# Patient Record
Sex: FEMALE | Race: OTHER | URBAN - METROPOLITAN AREA
[De-identification: names, ages, dates, MRNs, and addresses within clinical notes are randomized per-mention and may not be internally consistent; named-entity substitution may affect disease eponyms.]

---
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
## 2025-03-25 NOTE — PROCEDURAL SAFETY CHECKLIST WITH OR WITHOUT SEDATION - NSRESOLVEDISCREP_GEN_ALL_CORE
[facility] EMERGENCY DEPARTMENT      EMERGENCY MEDICINE     Pt Name: Rahel Blount  MRN: [id number]  Birthdate 1989  Date of evaluation: 3/25/2025  Provider: Claudia Moncada PA-C    CHIEF COMPLAINT       Chief Complaint   Patient presents with    Back Pain     HISTORY OF PRESENT ILLNESS   Rahel Blount is a pleasant 35 y.o. female who presents to the emergency department from from home, as a walk in to the ED lobby for evaluation of back pain.    Patient presents to the ED for low back pain.  She stated that it started on 3/23/2025 on  after she was working all day.  She stated that at the end of her shift she felt her low back started.  She describes the pain as constant, sometimes sharp.  Patient denies any injury to the area, lifting heavy objects, strain to the back.  Patient went to the chiropractor yesterday with no alleviation.  Patient denies taking any pain medication.  Patient denies any spinal surgeries in the past.    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

## 2025-05-09 NOTE — ED PEDIATRIC NURSE NOTE - NS CRAFFT PART A2 ALCOHOL
Problem: Discharge Planning  Goal: Discharge to home or other facility with appropriate resources  5/9/2025 0104 by Renetta Brown, RN  Outcome: Progressing     Problem: Skin/Tissue Integrity  Goal: Skin integrity remains intact  Description: 1.  Monitor for areas of redness and/or skin breakdown2.  Assess vascular access sites hourly3.  Every 4-6 hours minimum:  Change oxygen saturation probe site4.  Every 4-6 hours:  If on nasal continuous positive airway pressure, respiratory therapy assess nares and determine need for appliance change or resting period  5/9/2025 0104 by Renetta Brown, RN  Outcome: Progressing  Flowsheets (Taken 5/9/2025 0103)  Skin Integrity Remains Intact: Monitor for areas of redness and/or skin breakdown     Problem: Safety - Adult  Goal: Free from fall injury  5/9/2025 0104 by Renetta Brown, RN  Outcome: Progressing     Problem: ABCDS Injury Assessment  Goal: Absence of physical injury  Outcome: Progressing     Problem: Nutrition Deficit:  Goal: Optimize nutritional status  Outcome: Progressing     Problem: Chronic Conditions and Co-morbidities  Goal: Patient's chronic conditions and co-morbidity symptoms are monitored and maintained or improved  Outcome: Progressing      No

## 2025-06-30 ENCOUNTER — EMERGENCY (EMERGENCY)
Facility: HOSPITAL | Age: 20
LOS: 0 days | Discharge: HOME | End: 2025-06-30
Attending: PEDIATRICS
Payer: MEDICAID

## 2025-06-30 VITALS
WEIGHT: 134.04 LBS | OXYGEN SATURATION: 97 % | RESPIRATION RATE: 19 BRPM | SYSTOLIC BLOOD PRESSURE: 99 MMHG | HEART RATE: 131 BPM | DIASTOLIC BLOOD PRESSURE: 69 MMHG | TEMPERATURE: 99 F

## 2025-06-30 VITALS
OXYGEN SATURATION: 98 % | TEMPERATURE: 98 F | RESPIRATION RATE: 18 BRPM | SYSTOLIC BLOOD PRESSURE: 115 MMHG | DIASTOLIC BLOOD PRESSURE: 73 MMHG | HEART RATE: 85 BPM

## 2025-06-30 DIAGNOSIS — R11.10 VOMITING, UNSPECIFIED: ICD-10-CM

## 2025-06-30 DIAGNOSIS — N76.0 ACUTE VAGINITIS: ICD-10-CM

## 2025-06-30 DIAGNOSIS — R10.32 LEFT LOWER QUADRANT PAIN: ICD-10-CM

## 2025-06-30 DIAGNOSIS — R00.0 TACHYCARDIA, UNSPECIFIED: ICD-10-CM

## 2025-06-30 DIAGNOSIS — N83.202 UNSPECIFIED OVARIAN CYST, LEFT SIDE: ICD-10-CM

## 2025-06-30 DIAGNOSIS — G60.0 HEREDITARY MOTOR AND SENSORY NEUROPATHY: ICD-10-CM

## 2025-06-30 DIAGNOSIS — R10.2 PELVIC AND PERINEAL PAIN: ICD-10-CM

## 2025-06-30 DIAGNOSIS — R19.7 DIARRHEA, UNSPECIFIED: ICD-10-CM

## 2025-06-30 LAB
ALBUMIN SERPL ELPH-MCNC: 4 G/DL — SIGNIFICANT CHANGE UP (ref 3.5–5.2)
ALP SERPL-CCNC: 39 U/L — SIGNIFICANT CHANGE UP (ref 30–115)
ALT FLD-CCNC: 8 U/L — LOW (ref 14–37)
ANION GAP SERPL CALC-SCNC: 12 MMOL/L — SIGNIFICANT CHANGE UP (ref 7–14)
APPEARANCE UR: CLEAR — SIGNIFICANT CHANGE UP
AST SERPL-CCNC: 18 U/L — SIGNIFICANT CHANGE UP (ref 14–37)
BASOPHILS # BLD AUTO: 0.01 K/UL — SIGNIFICANT CHANGE UP (ref 0–0.2)
BASOPHILS NFR BLD AUTO: 0.1 % — SIGNIFICANT CHANGE UP (ref 0–1)
BILIRUB SERPL-MCNC: 0.3 MG/DL — SIGNIFICANT CHANGE UP (ref 0.2–1.2)
BILIRUB UR-MCNC: NEGATIVE — SIGNIFICANT CHANGE UP
BUN SERPL-MCNC: 11 MG/DL — SIGNIFICANT CHANGE UP (ref 10–20)
CALCIUM SERPL-MCNC: 8.7 MG/DL — SIGNIFICANT CHANGE UP (ref 8.4–10.5)
CHLORIDE SERPL-SCNC: 103 MMOL/L — SIGNIFICANT CHANGE UP (ref 98–110)
CO2 SERPL-SCNC: 20 MMOL/L — SIGNIFICANT CHANGE UP (ref 17–32)
COLOR SPEC: YELLOW — SIGNIFICANT CHANGE UP
CREAT SERPL-MCNC: 0.6 MG/DL — SIGNIFICANT CHANGE UP (ref 0.3–1)
DIFF PNL FLD: ABNORMAL
EGFR: 133 ML/MIN/1.73M2 — SIGNIFICANT CHANGE UP
EGFR: 133 ML/MIN/1.73M2 — SIGNIFICANT CHANGE UP
EOSINOPHIL # BLD AUTO: 0 K/UL — SIGNIFICANT CHANGE UP (ref 0–0.7)
EOSINOPHIL NFR BLD AUTO: 0 % — SIGNIFICANT CHANGE UP (ref 0–8)
GLUCOSE SERPL-MCNC: 98 MG/DL — SIGNIFICANT CHANGE UP (ref 70–99)
GLUCOSE UR QL: NEGATIVE MG/DL — SIGNIFICANT CHANGE UP
HCT VFR BLD CALC: 38.8 % — SIGNIFICANT CHANGE UP (ref 37–47)
HGB BLD-MCNC: 12.7 G/DL — SIGNIFICANT CHANGE UP (ref 12–16)
IMM GRANULOCYTES NFR BLD AUTO: 0.5 % — HIGH (ref 0.1–0.3)
INR BLD: 1.29 RATIO — SIGNIFICANT CHANGE UP (ref 0.65–1.3)
KETONES UR QL: 15 MG/DL
LEUKOCYTE ESTERASE UR-ACNC: ABNORMAL
LYMPHOCYTES # BLD AUTO: 0.81 K/UL — LOW (ref 1.2–3.4)
LYMPHOCYTES # BLD AUTO: 6.8 % — LOW (ref 20.5–51.1)
MCHC RBC-ENTMCNC: 28.9 PG — SIGNIFICANT CHANGE UP (ref 27–31)
MCHC RBC-ENTMCNC: 32.7 G/DL — SIGNIFICANT CHANGE UP (ref 32–37)
MCV RBC AUTO: 88.2 FL — SIGNIFICANT CHANGE UP (ref 81–99)
MONOCYTES # BLD AUTO: 0.54 K/UL — SIGNIFICANT CHANGE UP (ref 0.1–0.6)
MONOCYTES NFR BLD AUTO: 4.5 % — SIGNIFICANT CHANGE UP (ref 1.7–9.3)
NEUTROPHILS # BLD AUTO: 10.53 K/UL — HIGH (ref 1.4–6.5)
NEUTROPHILS NFR BLD AUTO: 88.1 % — HIGH (ref 42.2–75.2)
NITRITE UR-MCNC: NEGATIVE — SIGNIFICANT CHANGE UP
NRBC BLD AUTO-RTO: 0 /100 WBCS — SIGNIFICANT CHANGE UP (ref 0–0)
PH UR: 5.5 — SIGNIFICANT CHANGE UP (ref 5–8)
PLATELET # BLD AUTO: 216 K/UL — SIGNIFICANT CHANGE UP (ref 130–400)
PMV BLD: 10.8 FL — HIGH (ref 7.4–10.4)
POTASSIUM SERPL-MCNC: 4 MMOL/L — SIGNIFICANT CHANGE UP (ref 3.5–5)
POTASSIUM SERPL-SCNC: 4 MMOL/L — SIGNIFICANT CHANGE UP (ref 3.5–5)
PROT SERPL-MCNC: 6.9 G/DL — SIGNIFICANT CHANGE UP (ref 6.1–8)
PROT UR-MCNC: SIGNIFICANT CHANGE UP MG/DL
PROTHROM AB SERPL-ACNC: 15.3 SEC — HIGH (ref 9.95–12.87)
RBC # BLD: 4.4 M/UL — SIGNIFICANT CHANGE UP (ref 4.2–5.4)
RBC # FLD: 14.6 % — HIGH (ref 11.5–14.5)
SODIUM SERPL-SCNC: 135 MMOL/L — SIGNIFICANT CHANGE UP (ref 135–146)
SP GR SPEC: 1.02 — SIGNIFICANT CHANGE UP (ref 1–1.03)
UROBILINOGEN FLD QL: 0.2 MG/DL — SIGNIFICANT CHANGE UP (ref 0.2–1)
WBC # BLD: 11.95 K/UL — HIGH (ref 4.8–10.8)
WBC # FLD AUTO: 11.95 K/UL — HIGH (ref 4.8–10.8)

## 2025-06-30 PROCEDURE — 87086 URINE CULTURE/COLONY COUNT: CPT

## 2025-06-30 PROCEDURE — 36415 COLL VENOUS BLD VENIPUNCTURE: CPT

## 2025-06-30 PROCEDURE — 87591 N.GONORRHOEAE DNA AMP PROB: CPT

## 2025-06-30 PROCEDURE — 86900 BLOOD TYPING SEROLOGIC ABO: CPT

## 2025-06-30 PROCEDURE — 81001 URINALYSIS AUTO W/SCOPE: CPT

## 2025-06-30 PROCEDURE — 96374 THER/PROPH/DIAG INJ IV PUSH: CPT

## 2025-06-30 PROCEDURE — 76705 ECHO EXAM OF ABDOMEN: CPT

## 2025-06-30 PROCEDURE — 80053 COMPREHEN METABOLIC PANEL: CPT

## 2025-06-30 PROCEDURE — 87798 DETECT AGENT NOS DNA AMP: CPT

## 2025-06-30 PROCEDURE — 76705 ECHO EXAM OF ABDOMEN: CPT | Mod: 26

## 2025-06-30 PROCEDURE — 85610 PROTHROMBIN TIME: CPT

## 2025-06-30 PROCEDURE — 76830 TRANSVAGINAL US NON-OB: CPT | Mod: 26

## 2025-06-30 PROCEDURE — 87661 TRICHOMONAS VAGINALIS AMPLIF: CPT

## 2025-06-30 PROCEDURE — 87491 CHLMYD TRACH DNA AMP PROBE: CPT

## 2025-06-30 PROCEDURE — 99283 EMERGENCY DEPT VISIT LOW MDM: CPT

## 2025-06-30 PROCEDURE — 86850 RBC ANTIBODY SCREEN: CPT

## 2025-06-30 PROCEDURE — 99285 EMERGENCY DEPT VISIT HI MDM: CPT

## 2025-06-30 PROCEDURE — 96375 TX/PRO/DX INJ NEW DRUG ADDON: CPT

## 2025-06-30 PROCEDURE — 86901 BLOOD TYPING SEROLOGIC RH(D): CPT

## 2025-06-30 PROCEDURE — 87801 DETECT AGNT MULT DNA AMPLI: CPT

## 2025-06-30 PROCEDURE — 99284 EMERGENCY DEPT VISIT MOD MDM: CPT | Mod: 25

## 2025-06-30 PROCEDURE — 85025 COMPLETE CBC W/AUTO DIFF WBC: CPT

## 2025-06-30 PROCEDURE — 76830 TRANSVAGINAL US NON-OB: CPT

## 2025-06-30 RX ORDER — SODIUM CHLORIDE 9 G/1000ML
1000 INJECTION, SOLUTION INTRAVENOUS ONCE
Refills: 0 | Status: COMPLETED | OUTPATIENT
Start: 2025-06-30 | End: 2025-06-30

## 2025-06-30 RX ORDER — ACETAMINOPHEN 500 MG/5ML
975 LIQUID (ML) ORAL ONCE
Refills: 0 | Status: COMPLETED | OUTPATIENT
Start: 2025-06-30 | End: 2025-06-30

## 2025-06-30 RX ORDER — ONDANSETRON HCL/PF 4 MG/2 ML
4 VIAL (ML) INJECTION ONCE
Refills: 0 | Status: COMPLETED | OUTPATIENT
Start: 2025-06-30 | End: 2025-06-30

## 2025-06-30 RX ORDER — ACETAMINOPHEN 500 MG/5ML
650 LIQUID (ML) ORAL ONCE
Refills: 0 | Status: COMPLETED | OUTPATIENT
Start: 2025-06-30 | End: 2025-06-30

## 2025-06-30 RX ORDER — IBUPROFEN 200 MG
800 TABLET ORAL ONCE
Refills: 0 | Status: COMPLETED | OUTPATIENT
Start: 2025-06-30 | End: 2025-06-30

## 2025-06-30 RX ADMIN — Medication 975 MILLIGRAM(S): at 17:06

## 2025-06-30 RX ADMIN — Medication 650 MILLIGRAM(S): at 12:40

## 2025-06-30 RX ADMIN — SODIUM CHLORIDE 1000 MILLILITER(S): 9 INJECTION, SOLUTION INTRAVENOUS at 12:40

## 2025-06-30 RX ADMIN — Medication 4 MILLIGRAM(S): at 12:41

## 2025-06-30 RX ADMIN — Medication 2 MILLIGRAM(S): at 13:12

## 2025-06-30 RX ADMIN — Medication 800 MILLIGRAM(S): at 17:23

## 2025-06-30 NOTE — ED PROVIDER NOTE - OBJECTIVE STATEMENT
19F A0, no PMHx, who presents to the ED for sudden onset abdominopelvic pain that start this morning. Patient refers that she developed sudden onset abdominal pelvic pain this morning that started while she was at rest. Since then the patient has had multiple episodes of NBNB vomiting and 10 episodes of diarrhea. Patient refers that she is not eating and drinking because of the nausea and as a result is increasingly weak. Otherwise denies cp, sob, back pain, changes in urinary habitus, calf tenderness or swelling, recent travel, and sick contacts. Patient refers that she had similar symptoms in the past during her previous pregnancy. Refers she is sexually active with one partner, has a Nexplanon implant.

## 2025-06-30 NOTE — ED PROVIDER NOTE - NSFOLLOWUPINSTRUCTIONS_ED_ALL_ED_FT
Our Emergency Department Referral Coordinators will be reaching out to you in the next 24-48 hours from 9:00am to 5:00pm (Monday to Friday) with a follow up appointment. Please expect a phone call from the hospital in that time frame. If you do not receive a call or if you have any questions or concerns, you can reach them at [phone]      Ovarian Torsion    WHAT YOU NEED TO KNOW:    What is ovarian torsion? Ovarian torsion is a condition that causes twisting of a ligament that supports the ovary. The ovary may also become twisted with the fallopian tube. Blood flow to the ovary is reduced or blocked. The lack of blood flow can cause ovary necrosis (tissue death). Ovarian torsion needs immediate care to prevent this from happening. Torsion usually happens to only one ovary but can happen to both.  Ovarian Torsion    What increases my risk for ovarian torsion? The cause may not be known. Any of the following may increase your risk:    A large mass on the ovary, such as a tumor or cyst    Pregnancy    Fertility treatment to become pregnant    Polycystic ovary syndrome (PCOS)    A past ovarian torsion  What are the signs and symptoms of ovarian torsion?    Severe pain in your abdomen or pelvis that is worse with movement    Pain that goes to your back or down your side    Nausea and vomiting    Tender abdomen or pelvis    A fever  How is ovarian torsion diagnosed? Your healthcare provider will examine you and ask about your symptoms. Include when the symptoms began and how severe they are. You may need other tests than those listed below to rule out another condition, such as appendicitis. Any of the following may be used to find ovarian torsion or a health problem it is causing:    An ultrasound uses sound waves to show pictures on a monitor. An ultrasound may show ovarian torsion and any problems in your ovary, such as an enlarged ovary or abnormal blood flow.    A pelvic exam is done to check for a mass or other condition that may be causing your symptoms.    Blood tests are used to check for a problem ovarian torsion can cause, such as anemia (low red blood cells). If you are a person of childbearing age, blood tests may be used to check for pregnancy.    Surgery may be needed so healthcare providers can see your ovary to check for torsion.  How is ovarian torsion treated? You must see a healthcare provider as soon as possible. You may need any of the following:    Surgery is used to untwist the ovary. Your surgeon will also check to see if a lack of blood flow damaged the ovary. The ovary may need to be removed. If you have not gone through menopause yet, your surgeon may try to leave the ovary in place. Any cysts on the ovary may be removed. The fallopian tube may also need to be removed if it is twisted with the ovary.    Prescription pain medicine may be given. Ask your healthcare provider how to take this medicine safely. Some prescription pain medicines contain acetaminophen. Do not take other medicines that contain acetaminophen without talking to your healthcare provider. Too much acetaminophen may cause liver damage. Prescription pain medicine may cause constipation. Ask your healthcare provider how to prevent or treat constipation.  What can I do to prevent ovarian torsion?    Know what to do if you are at high risk for ovarian torsion. An ovarian cyst, pregnancy, or fertility treatment put you at high risk. Seek immediate care if you have any signs or symptoms of ovarian torsion, such as sudden, severe pelvic pain.    Birth control pills may be prescribed if you are a person of childbearing age. Certain birth control pills help prevent ovulation. This helps prevent ovarian cysts that may lead to ovarian torsion. Talk to your healthcare provider about family planning, if needed.  When should I seek immediate care?    You have a fever that is getting higher.    You have new or worsening symptoms, such as increased pain or vomiting.  When should I call my doctor?    You have questions or concerns about your condition or care.    Ovarian Cyst  The female reproductive system, with a close-up of the ovaries and an ovarian cyst.  An ovarian cyst is a fluid-filled sac that forms on an ovary. The ovaries are small organs that produce eggs in females. Many types of cysts can form on the ovaries. Most of these cysts go away on their own and are not cancer. Some cysts need treatment.    What are the causes?  Ovarian cysts may be caused by:  Chemical or hormone changes in your body during your normal monthly period.  Polycystic ovarian syndrome (PCOS). This is a common problem of the hormones.  Endometriosis. The tissue that lines the inside of the uterus grows outside of the uterus. If it grows on your ovaries, it can form cysts.  Pregnancy. Your body makes more hormones than normal to support the pregnancy. This can form cysts.  Infection. Infection in your uterus can spread to your ovaries and can form cysts.  What increases the risk?  You are more likely to get this condition if:  You take medicines to help you get pregnant.  You have family members who have ovarian cysts.  What are the signs or symptoms?  Many ovarian cysts do not cause symptoms. If you have symptoms, they may include:  Pain or pressure around the pelvis. The pelvis is the area between the hip bones.  Pain in the lower belly.  Pain during sex.  Swelling in the lower belly.  Periods that don't come at the same time each month.  Pain during a period.  How is this diagnosed?  These cysts are found during a routine exam of the pelvis. You may have other tests to find out more about the cysts. Tests include ultrasound, CT scan, MRI, and blood tests.    How is this treated?  Many ovarian cysts go away on their own without treatment. When treatment is needed, it may include:  Medicines to help treat pain.  A procedure to drain the cyst.  Surgery to take out the cyst.  Hormones or birth control pills.  Surgery to take out the ovary.  Follow these instructions at home:  Take all medicines only as told by your health care provider.  If told, get Pap tests and regular exams of the pelvis.  Do not smoke, vape, or use nicotine or tobacco.  Return to your normal activities when your provider says that it's safe.  Keep all follow-up visits. Your provider may want to check your cyst for 2–3 months to see if it changes.  If you're in menopause, it's important to have your cyst checked closely because females in this age group have a higher rate of cancer of the ovaries.  Contact a health care provider if:  You have any new symptoms.  Your symptoms get worse.  Get help right away if:  You have really bad pain in the belly or pelvis, and the pain comes on all of a sudden.  You have heavy bleeding that soaks through more than one pad every hour.

## 2025-06-30 NOTE — ED PROVIDER NOTE - NSPTACCESSSVCSAPPTDETAILS_ED_ALL_ED_FT
Large ovarian cyst,  will benefit from serial monitoring out patient    GI: Patient may benefit from diagnostic intensity/GI evaluation.

## 2025-06-30 NOTE — ED PROVIDER NOTE - PROGRESS NOTE DETAILS
RAINE LINDER:  Progress note delayed due to need for ongoing care in the clinical care area.  Patient evaluated as per HPI and PE  History/PE, and VS suspicious for L. Ovarian Torsion.  Bedside US performed showing enlarged L. Ovary.  Formal US corroborating findings. OBGYN consulted.  OBGYN evaluated patient and initially referred some concern for torsion, but pain was well controlled and they recommended revaluation while patient off analgesia.   After revaluating patient OBGYN less concerned for pelvic pathology and recommended to evaluate patient's pain on oral analgesics.   Patient refers she feels well and her pain is controlled on Tylenol and Motrin PO.   Will discharge patient with OBGYN and GI follow-up, discharge instructions, and return precautions.

## 2025-06-30 NOTE — CONSULT NOTE ADULT - ASSESSMENT
20y/o P1 LMP 2 mo prior with Nexplanon in place presents to the ED with new sudden onset LLQ, GYN consulted for r/o ovarian torsion vs PID vs ruptured ovarian cyst    #ovarian cyst  -plan to reassess in 1 hour    #vaginal discharge  #CMT  -f/u vaginitis 20y/o P1 LMP 2 mo prior with Nexplanon in place presents to the ED with new sudden onset LLQ, GYN consulted for r/o ovarian torsion vs PID vs ruptured ovarian cyst    #ovarian cyst    #vaginal discharge  #CMT  -f/u vaginitis    ADDENDUM: Per ED resident, Pt is feeling much improved after PO analgesics and desires to discharge. Can f/u OP for routine GYN care.    Discussed with Dr. Vela

## 2025-06-30 NOTE — CONSULT NOTE ADULT - ATTENDING COMMENTS
Pt seen  surgery offered r/b discussed with pt  pt declining surgery    on sono + flow yet ovarian left size greater than right size  ED gave meds  pt feels better  discussed torsion possible intermittent torsion    pt desires to go home, no surgery  if pain presents again, return and needs diagnostic laparoscopy

## 2025-06-30 NOTE — ED PROVIDER NOTE - PATIENT PORTAL LINK FT
You can access the FollowMyHealth Patient Portal offered by [facility] by registering at the following website: [email or web address]. By joining [employer]’s FollowMyHealth portal, you will also be able to view your health information using other applications (apps) compatible with our system.

## 2025-06-30 NOTE — CONSULT NOTE ADULT - SUBJECTIVE AND OBJECTIVE BOX
Chief Complaint: LLQ pain    HPI:   20y/o  LMP 2 mo prior presents to the ED with new onset suddent LLQ pain. Pt explains she felt fevers and chills overnight that resolved with ibuprofen. This AM, she awoke with sudden 10/10 cramping pressure in the LLQ associated with multiple episodes of nausa, vomiting, fevers, chills, dizziness. Pt has felt relief with acetominophen (1240) and morphine (1312), now pain is 5/10. Pain is positional. Has felt similar pain previously when she was diagnosed with pyelonephritis in pregnancy.   Pt's OBGYN is Dr. Winter at [facility].     Ob/Gyn History:    FT , complicated by pyelonephritis     LMP - 2 mo prior, Nexplanon in place                  Cycle Length - previously regular, now irregular with Nexplanon  Denies history of ovarian cysts, uterine fibroids, abnormal paps, or STIs    Home Medications:  acetaminophen 325 mg oral tablet: 3 tab(s) orally every 6 hours, As Needed (2022 06:49)  ibuprofen 600 mg oral tablet: 1 tab(s) orally every 6 hours, As Needed (2022 06:49)    Allergies  No Known Allergies  Intolerances  Denies    PAST MEDICAL & SURGICAL HISTORY:  Childhood asthma  No significant past surgical history    FAMILY HISTORY:  Denies    SOCIAL HISTORY: Denies cigarette use, alcohol use, or illicit drug use    Vital Signs Last 24 Hrs  T(F): 98.9 (2025 12:09), Max: 98.9 (2025 12:09)  HR: 131 (2025 12:09) (131 - 131)  BP: 99/69 (2025 12:09) (99/69 - 99/69)  RR: 19 (2025 12:09) (19 - 19)    General Appearance - AAOx3, NAD  Abdomen - Soft, mildly tender to palpation in LLQ, nondistended, no rebound, no rigidity, no guarding, bowel sounds present  Back - no CMT    GYN/Pelvis:  Labia Majora - Normal  Labia Minora - Normal  Vagina - Moderate amount of yellow and watery discharge noted in vaginal vault, no bleeding  Cervix - Visually closed, mild CMT    Uterus:  Size - Small, anteverted  Tenderness - None  Mass - None  Freely mobile    Adnexa:  Masses - None palpated  Tenderness - tenderness in L adnexa, none on R adnexa      LABS:                        12.7   11.95 )-----------( 216      ( 2025 12:50 )             38.8       ABO RH Interpretation: O POS (25 @ 12:50)  Antibody Screen: NEG (25 @ 12:50)        135  |  103  |  11  ----------------------------<  98  4.0   |  20  |  0.6    Ca    8.7      2025 12:50    TPro  6.9  /  Alb  4.0  /  TBili  0.3  /  DBili  x   /  AST  18  /  ALT  8[L]  /  AlkPhos  39        Urinalysis Basic - ( 2025 13:20 )    Color: Yellow / Appearance: Clear / S.025 / pH: x  Gluc: x / Ketone: x  / Bili: Negative / Urobili: 0.2 mg/dL   Blood: x / Protein: Trace mg/dL / Nitrite: Negative   Leuk Esterase: Moderate / RBC: 4 /HPF / WBC 14 /HPF   Sq Epi: x / Non Sq Epi: 14 /HPF / Bacteria: Occasional /HPF      RADIOLOGY & ADDITIONAL STUDIES:  < from: US Transvaginal (25 @ 13:37) >  ACC: [id number] EXAM:  US TRANSVAGINAL   ORDERED BY: NATALIA LINDER     PROCEDURE DATE:  2025    INTERPRETATION:  CLINICAL INFORMATION: Pain    LMP: Per patient no menses for 2 months    COMPARISON: Pelvic sonogram 2022    TECHNIQUE:  Endovaginal and transabdominal pelvic sonogram. Color and Spectral   Doppler was performed.    FINDINGS:  Uterus: 8.0 x 3.7 x 5.4 cm. Within normal limits.  Endometrium: 0.51 cm. Trace fluid in the canal.    Right ovary: 2.7 cm x 1.7 cm x 2.2 cm, volume 5 cc. Within normal limits.   Ovarian flow is identified.  Left ovary: 4.3 cm x 3.7 cm x 3.9 cm, volume 32 cc. Simple cyst measuring   3.8 x 3.3 x 3.7 cm Ovarian flow is identified.    Fluid: None.    IMPRESSION:    Enlarged left ovary (6 times the size of the right) containing a 3.8 cm   simple cyst. Ovarian torsion should be ruled out on a clinical basis.    --- End of Report ---    RODOLFO GROVE MD; Attending Radiologist  This document has been electronically signed. 2025  1:45PM   Chief Complaint: LLQ pain    HPI:   20y/o  LMP 2 mo prior presents to the ED with new onset suddent LLQ pain. Pt explains she felt fevers and chills overnight that resolved with ibuprofen. This AM, she awoke with sudden 10/10 cramping pressure in the LLQ associated with multiple episodes of nausa, vomiting, fevers, chills, dizziness. Pt has felt relief with acetominophen (1240) and morphine (1312), now pain is 5/10. Pain is positional. Has felt similar pain previously when she was diagnosed with pyelonephritis in pregnancy.   Pt's OBGYN is Dr. Winter at [facility].     Ob/Gyn History:    FT , complicated by pyelonephritis     LMP - 2 mo prior, Nexplanon in place                  Cycle Length - previously regular, now irregular with Nexplanon  Denies history of ovarian cysts, uterine fibroids, abnormal paps, or STIs    Home Medications:  acetaminophen 325 mg oral tablet: 3 tab(s) orally every 6 hours, As Needed (2022 06:49)  ibuprofen 600 mg oral tablet: 1 tab(s) orally every 6 hours, As Needed (2022 06:49)    Allergies  No Known Allergies  Intolerances  Denies    PAST MEDICAL & SURGICAL HISTORY:  Childhood asthma  No significant past surgical history    FAMILY HISTORY:  Denies    SOCIAL HISTORY: Denies cigarette use, alcohol use, or illicit drug use    Vital Signs Last 24 Hrs  T(F): 98.9 (2025 12:09), Max: 98.9 (2025 12:09)  HR: 131 (2025 12:09) (131 - 131)  BP: 99/69 (2025 12:09) (99/69 - 99/69)  RR: 19 (2025 12:09) (19 - 19)    General Appearance - AAOx3, NAD  Abdomen - Soft, mildly tender to palpation in LLQ, nondistended, no rebound, no rigidity, no guarding, bowel sounds present  Back - no CMT    GYN/Pelvis:  Labia Majora - Normal  Labia Minora - Normal  Vagina - Moderate amount of yellow and watery discharge noted in vaginal vault, no bleeding  Cervix - Visually closed, mild CMT    Uterus:  Size - Small, anteverted  Tenderness - None  Mass - None  Freely mobile    Adnexa:  Masses - None palpated      LABS:                        12.7   11.95 )-----------( 216      ( 2025 12:50 )             38.8       ABO RH Interpretation: O POS (25 @ 12:50)  Antibody Screen: NEG (25 @ 12:50)        135  |  103  |  11  ----------------------------<  98  4.0   |  20  |  0.6    Ca    8.7      2025 12:50    TPro  6.9  /  Alb  4.0  /  TBili  0.3  /  DBili  x   /  AST  18  /  ALT  8[L]  /  AlkPhos  39        Urinalysis Basic - ( 2025 13:20 )    Color: Yellow / Appearance: Clear / S.025 / pH: x  Gluc: x / Ketone: x  / Bili: Negative / Urobili: 0.2 mg/dL   Blood: x / Protein: Trace mg/dL / Nitrite: Negative   Leuk Esterase: Moderate / RBC: 4 /HPF / WBC 14 /HPF   Sq Epi: x / Non Sq Epi: 14 /HPF / Bacteria: Occasional /HPF      RADIOLOGY & ADDITIONAL STUDIES:  < from: US Transvaginal (25 @ 13:37) >  ACC: [id number] EXAM:  US TRANSVAGINAL   ORDERED BY: NATALIA LINDER     PROCEDURE DATE:  2025    INTERPRETATION:  CLINICAL INFORMATION: Pain    LMP: Per patient no menses for 2 months    COMPARISON: Pelvic sonogram 2022    TECHNIQUE:  Endovaginal and transabdominal pelvic sonogram. Color and Spectral   Doppler was performed.    FINDINGS:  Uterus: 8.0 x 3.7 x 5.4 cm. Within normal limits.  Endometrium: 0.51 cm. Trace fluid in the canal.    Right ovary: 2.7 cm x 1.7 cm x 2.2 cm, volume 5 cc. Within normal limits.   Ovarian flow is identified.  Left ovary: 4.3 cm x 3.7 cm x 3.9 cm, volume 32 cc. Simple cyst measuring   3.8 x 3.3 x 3.7 cm Ovarian flow is identified.    Fluid: None.    IMPRESSION:    Enlarged left ovary (6 times the size of the right) containing a 3.8 cm   simple cyst. Ovarian torsion should be ruled out on a clinical basis.    --- End of Report ---    RODOLFO GROVE MD; Attending Radiologist  This document has been electronically signed. 2025  1:45PM

## 2025-06-30 NOTE — ED PROVIDER NOTE - CLINICAL SUMMARY MEDICAL DECISION MAKING FREE TEXT BOX
Patient with left large ovarian cyst.  Pain improved.  GYN consulted.  Patient observed in ER for prolonged period of time turning of pain.  GYN offered operative management but patient declined.  Strict return precautions given.  Patient comfortable plan for outpatient follow-up.

## 2025-06-30 NOTE — ED PROVIDER NOTE - ATTENDING CONTRIBUTION TO CARE
19-year-old female presents to the ED for sudden onset abdominal pain that started acutely this morning associate multiple episodes of nonbloody nonbilious emesis.  Also reports some diarrhea.  Has not been able to eat or drink anything since the pain started.  No recent travel.  Patient is sexually active and has Nexplanon so she has not had her menses in the last 2 months.  Denies any dysuria.  Denies fevers.  No headache.  No chest pain or shortness of breath.  Vital signs reviewed general uncomfortable appearing writhing on the bed in pain HEENT PERRLA EOMI TMs clear pharynx clear moist mucous membranes CVS S1-S2 no murmurs lungs clear to auscultation bilaterally abdomen soft not rigid TTP to left lower quadrant nondistended extremities full range of motion x4 skin no rashes warm well perfused.  Patient with sudden onset abdominal pain.  Fast obtained upon patient evaluation negative for free fluid large left-sided ovarian cyst visualized.  Will get labs, pain control, UA., ultrasound pelvis

## 2025-06-30 NOTE — ED PROVIDER NOTE - PHYSICAL EXAMINATION
CONSTITUTIONAL: well developed, ill appearing, speaking in full sentences  SKIN: warm, dry, no rash, cap refill < 2 seconds  HEENT: normocephalic, atraumatic, no conjunctival erythema, dry mucous membranes, patent airway  NECK: supple  CV:  tachycardic, regular rhythm, 2+ radial pulses bilaterally  RESP: no wheezes, no rales, no rhonchi, normal work of breathing  ABD: soft, suprapubic and LLQ tenderness to palpation, no palpable masses, nondistended, no rebound, no guarding  MSK: normal ROM, no cyanosis, no edema  NEURO: alert, oriented, grossly unremarkable  PSYCH: cooperative, appropriate

## 2025-07-04 LAB
A VAGINAE DNA VAG QL NAA+PROBE: SIGNIFICANT CHANGE UP
BVAB2 DNA VAG QL NAA+PROBE: SIGNIFICANT CHANGE UP
C ALBICANS DNA VAG QL NAA+PROBE: POSITIVE
C GLABRATA DNA VAG QL NAA+PROBE: NEGATIVE — SIGNIFICANT CHANGE UP
C KRUSEI DNA VAG QL NAA+PROBE: NEGATIVE — SIGNIFICANT CHANGE UP
C LUSITANIAE DNA VAG QL NAA+PROBE: NEGATIVE — SIGNIFICANT CHANGE UP
C TRACH RRNA SPEC QL NAA+PROBE: NEGATIVE — SIGNIFICANT CHANGE UP
CANDIDA DNA VAG QL NAA+PROBE: NEGATIVE — SIGNIFICANT CHANGE UP
MEGA1 DNA VAG QL NAA+PROBE: SIGNIFICANT CHANGE UP
N GONORRHOEA RRNA SPEC QL NAA+PROBE: NEGATIVE — SIGNIFICANT CHANGE UP
T VAGINALIS RRNA SPEC QL NAA+PROBE: NEGATIVE — SIGNIFICANT CHANGE UP